# Patient Record
Sex: FEMALE | Race: WHITE | NOT HISPANIC OR LATINO | Employment: UNEMPLOYED | URBAN - METROPOLITAN AREA
[De-identification: names, ages, dates, MRNs, and addresses within clinical notes are randomized per-mention and may not be internally consistent; named-entity substitution may affect disease eponyms.]

---

## 2021-08-11 ENCOUNTER — NURSE TRIAGE (OUTPATIENT)
Dept: ADMINISTRATIVE | Facility: CLINIC | Age: 36
End: 2021-08-11

## 2023-08-11 ENCOUNTER — TELEPHONE (OUTPATIENT)
Dept: OBSTETRICS AND GYNECOLOGY | Facility: CLINIC | Age: 38
End: 2023-08-11
Payer: MEDICAID

## 2023-08-11 NOTE — TELEPHONE ENCOUNTER
Pt calling in regards to being able to bring her children to her appointment and I informed her that she is allowed to bring her children. Pt v/u    ----- Message from Tanika Rivers sent at 8/11/2023  9:51 AM CDT -----  Contact: Patient  Patient called to consult with nurse or staff regarding her upcoming appointment. She wanted to know if she is able to bring her 2 kids if possible. She states she has a hard time finding someone to watch them at times. She would like a call back and can be reached at [phone]. Thanks/MR

## 2023-08-14 ENCOUNTER — OFFICE VISIT (OUTPATIENT)
Dept: OBSTETRICS AND GYNECOLOGY | Facility: CLINIC | Age: 38
End: 2023-08-14
Payer: MEDICAID

## 2023-08-14 VITALS
BODY MASS INDEX: 31.47 KG/M2 | SYSTOLIC BLOOD PRESSURE: 116 MMHG | HEART RATE: 76 BPM | WEIGHT: 195 LBS | DIASTOLIC BLOOD PRESSURE: 72 MMHG

## 2023-08-14 DIAGNOSIS — T19.2XXA RETAINED TAMPON NOT FOUND ON EXAMINATION: Primary | ICD-10-CM

## 2023-08-14 DIAGNOSIS — Z98.51 H/O TUBAL LIGATION: ICD-10-CM

## 2023-08-14 DIAGNOSIS — N94.6 DYSMENORRHEA: ICD-10-CM

## 2023-08-14 DIAGNOSIS — Z98.891 H/O: C-SECTION: ICD-10-CM

## 2023-08-14 DIAGNOSIS — N93.9 ABNORMAL UTERINE BLEEDING (AUB): ICD-10-CM
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
## 2023-08-14 PROCEDURE — 1160F PR REVIEW ALL MEDS BY PRESCRIBER/CLIN PHARMACIST DOCUMENTED: ICD-10-PCS | Mod: CPTII,S$GLB,, | Performed by: STUDENT IN AN ORGANIZED HEALTH CARE EDUCATION/TRAINING PROGRAM

## 2023-08-14 PROCEDURE — 3074F SYST BP LT 130 MM HG: CPT | Mod: CPTII,S$GLB,, | Performed by: STUDENT IN AN ORGANIZED HEALTH CARE EDUCATION/TRAINING PROGRAM

## 2023-08-14 PROCEDURE — 1159F PR MEDICATION LIST DOCUMENTED IN MEDICAL RECORD: ICD-10-PCS | Mod: CPTII,S$GLB,, | Performed by: STUDENT IN AN ORGANIZED HEALTH CARE EDUCATION/TRAINING PROGRAM

## 2023-08-14 PROCEDURE — 99203 PR OFFICE/OUTPT VISIT, NEW, LEVL III, 30-44 MIN: ICD-10-PCS | Mod: S$GLB,,, | Performed by: STUDENT IN AN ORGANIZED HEALTH CARE EDUCATION/TRAINING PROGRAM

## 2023-08-14 PROCEDURE — 3078F DIAST BP <80 MM HG: CPT | Mod: CPTII,S$GLB,, | Performed by: STUDENT IN AN ORGANIZED HEALTH CARE EDUCATION/TRAINING PROGRAM

## 2023-08-14 PROCEDURE — 1160F RVW MEDS BY RX/DR IN RCRD: CPT | Mod: CPTII,S$GLB,, | Performed by: STUDENT IN AN ORGANIZED HEALTH CARE EDUCATION/TRAINING PROGRAM

## 2023-08-14 PROCEDURE — 99203 OFFICE O/P NEW LOW 30 MIN: CPT | Mod: S$GLB,,, | Performed by: STUDENT IN AN ORGANIZED HEALTH CARE EDUCATION/TRAINING PROGRAM

## 2023-08-14 PROCEDURE — 3078F PR MOST RECENT DIASTOLIC BLOOD PRESSURE < 80 MM HG: ICD-10-PCS | Mod: CPTII,S$GLB,, | Performed by: STUDENT IN AN ORGANIZED HEALTH CARE EDUCATION/TRAINING PROGRAM

## 2023-08-14 PROCEDURE — 3074F PR MOST RECENT SYSTOLIC BLOOD PRESSURE < 130 MM HG: ICD-10-PCS | Mod: CPTII,S$GLB,, | Performed by: STUDENT IN AN ORGANIZED HEALTH CARE EDUCATION/TRAINING PROGRAM

## 2023-08-14 PROCEDURE — 3008F BODY MASS INDEX DOCD: CPT | Mod: CPTII,S$GLB,, | Performed by: STUDENT IN AN ORGANIZED HEALTH CARE EDUCATION/TRAINING PROGRAM

## 2023-08-14 PROCEDURE — 1159F MED LIST DOCD IN RCRD: CPT | Mod: CPTII,S$GLB,, | Performed by: STUDENT IN AN ORGANIZED HEALTH CARE EDUCATION/TRAINING PROGRAM

## 2023-08-14 PROCEDURE — 3008F PR BODY MASS INDEX (BMI) DOCUMENTED: ICD-10-PCS | Mod: CPTII,S$GLB,, | Performed by: STUDENT IN AN ORGANIZED HEALTH CARE EDUCATION/TRAINING PROGRAM

## 2023-08-14 NOTE — PROGRESS NOTES
Chief Complaint:  Possible retained tampon    HPI: Patient is a 38 y.o. y.o. female  who presents to GYN clinic for possible retained tampon.  Patient reports having a tampon in place, she did have intercourse and is unsure if it was able to be removed.  She reports going to place for 5-6 days.  She denies any vaginal discharge or odor.  She does report some leaking of urine.  She has no other complaints today..    Review of Systems   Constitutional:  Negative for chills and fever.   HENT:  Negative for congestion and sore throat.    Respiratory:  Negative for cough and shortness of breath.    Cardiovascular:  Negative for chest pain and palpitations.   Gastrointestinal:  Negative for abdominal pain, nausea and vomiting.   Genitourinary:  Negative for dysuria, frequency and urgency.   Musculoskeletal:  Negative for back pain.   Skin:  Negative for itching and rash.   Neurological:  Negative for headaches.   All other systems reviewed and are negative.    PMH:  Anxiety/depression, hypertension, ADHD  PSH:  , BTL  NKDA  Obhx:   GYNhx: denies abnormal pap smears, denies STD's  Social hx: + tob use, denies d/e  Family hx: denies breast, colon, ovarian or uterine cancers.   Current Outpatient Medications   Medication Instructions    dextroamphetamine-amphetamine (ADDERALL XR) 30 MG 24 hr capsule 30 mg, Oral, 2 times daily    gabapentin (NEURONTIN) 300 MG capsule 300 capsules, Oral, 2 times daily    hydrOXYzine pamoate (VISTARIL) 25 mg, Oral, Daily PRN    lisinopriL 10 mg, Oral, Daily     Physical Exam:  Vitals:    23 1337   BP: 116/72   Pulse: 76   Weight: 88.5 kg (195 lb)   Body mass index is 31.47 kg/m².    Gen: NAD, well developed, well nourished  Psych: alert and oriented x 3, normal affect  HEENT: normocephalic, atraumatic  Abd: soft, non-tender, non-distended  Pelvic: Normal external female genitalia, no masses or lesions, vagina pink with rugated, no vaginal bleeding for discharge, tampon  was and on exam not friable cervix   Bimanual exam:  No tampon appreciated  Skin: Warm and dry  Ext: no c/c/c, moves all extremities  Neurological: normal gait, gross motor function intact    Assessment: Patient is a 38 y.o. y.o. female  with  Patient Active Problem List   Diagnosis    H/O:     H/O tubal ligation    Abnormal uterine bleeding (AUB)    Dysmenorrhea       Plan:  Patient given reassurance, no tampon noted on exam today   Patient does complain of heavy menstrual cycles and cramping  Will get pelvic ultrasound today  We discussed treatment options for her abnormal bleeding including medical versus surgical options, patient leaning towards surgery at this time   Will have patient return to clinic for results and continue discussed treatment    Cristobal Brown MD

## 2023-08-21 ENCOUNTER — PROCEDURE VISIT (OUTPATIENT)
Dept: OBSTETRICS AND GYNECOLOGY | Facility: CLINIC | Age: 38
End: 2023-08-21
Payer: MEDICAID

## 2023-08-21 DIAGNOSIS — R10.2 PELVIC PAIN: Primary | ICD-10-CM

## 2023-08-21 DIAGNOSIS — R10.2 PELVIC PAIN: ICD-10-CM

## 2023-08-21 PROCEDURE — 76856 US OB/GYN PROCEDURE (VIEWPOINT): ICD-10-PCS | Mod: S$GLB,,, | Performed by: STUDENT IN AN ORGANIZED HEALTH CARE EDUCATION/TRAINING PROGRAM

## 2023-08-21 PROCEDURE — 76856 US EXAM PELVIC COMPLETE: CPT | Mod: S$GLB,,, | Performed by: STUDENT IN AN ORGANIZED HEALTH CARE EDUCATION/TRAINING PROGRAM
